# Patient Record
Sex: FEMALE | Race: WHITE | NOT HISPANIC OR LATINO | Employment: UNEMPLOYED | URBAN - METROPOLITAN AREA
[De-identification: names, ages, dates, MRNs, and addresses within clinical notes are randomized per-mention and may not be internally consistent; named-entity substitution may affect disease eponyms.]

---

## 2022-05-19 ENCOUNTER — TRANSCRIBE ORDERS (OUTPATIENT)
Dept: PHYSICAL THERAPY | Facility: HOSPITAL | Age: 72
End: 2022-05-19

## 2022-05-19 ENCOUNTER — HOSPITAL ENCOUNTER (OUTPATIENT)
Dept: PHYSICAL THERAPY | Facility: HOSPITAL | Age: 72
Setting detail: THERAPIES SERIES
Discharge: HOME | End: 2022-05-19

## 2022-05-19 DIAGNOSIS — M25.562 CHRONIC PAIN OF LEFT KNEE: Primary | ICD-10-CM

## 2022-05-19 DIAGNOSIS — G89.29 CHRONIC PAIN OF LEFT KNEE: Primary | ICD-10-CM

## 2022-05-19 DIAGNOSIS — M11.20 CHONDROCALCINOSIS: ICD-10-CM

## 2022-05-19 DIAGNOSIS — M17.12 OSTEOARTHRITIS OF LEFT KNEE, UNSPECIFIED OSTEOARTHRITIS TYPE: ICD-10-CM

## 2022-05-19 DIAGNOSIS — M25.562 ACUTE PAIN OF LEFT KNEE: Primary | ICD-10-CM

## 2022-05-19 PROCEDURE — 97161 PT EVAL LOW COMPLEX 20 MIN: CPT | Performed by: GENERAL ACUTE CARE HOSPITAL

## 2022-05-19 NOTE — THERAPY EVALUATION
Outpatient Physical Therapy Ortho Initial Evaluation  [facility]     Patient Name: Haydee Riggs  : 1950  MRN: [id number]  Today's Date: 2022      Visit Date: 2022    There is no problem list on file for this patient.       No past medical history on file.     No past surgical history on file.    Visit Dx:     ICD-10-CM ICD-9-CM   1. Chronic pain of left knee  M25.562 719.46    G89.29 338.29          Patient History     Row Name 22 1000             History    Chief Complaint Balance Problems;Difficulty Walking;Difficulty with daily activities;Joint stiffness;Muscle weakness;Pain  -HP      Type of Pain Knee pain  -HP      Date Current Problem(s) Began --    -      Brief Description of Current Complaint Patient states that in 2022 she woke up with increased significant left knee pain.  He states that her pain has been severe until about 3 weeks ago when she had an injection.  She states that the injection has helped significantly and she does not have as intense of pain in the left knee.  She currently ambulates with the use of a quad cane in the right hand.  -HP      Previous treatment for THIS PROBLEM Injections  -HP      Patient/Caregiver Goals Relieve pain;Return to prior level of function;Improve mobility;Improve strength;Know what to do to help the symptoms  -HP      Hand Dominance right-handed  -      Occupation/sports/leisure activities Gardening  -HP              Pain     Pain Location Knee  -      Pain at Present 0  -HP      Pain at Best 0  -HP      Pain at Worst 5  -HP      Pain Frequency Intermittent  -HP      Pain Description Aching;Discomfort;Sore;Tightness  -      What Performance Factors Make the Current Problem(s) WORSE? Use of the L LE  -      What Performance Factors Make the Current Problem(s) BETTER? Rest  -HP      Pain Comments Pain is along the medial aspect of the L knee the greatest recently  -      Tolerance Time- Standing WNL   -HP      Tolerance Time- Sitting WNL  -HP      Tolerance Time- Walking Increases pain  -HP      Tolerance Time- Lying WNL  -HP      Is your sleep disturbed? No  -HP              Fall Risk Assessment    Any falls in the past year: No  -HP              Daily Activities    Primary Language English  -HP      Pt Participated in POC and Goals Yes  -HP            User Key  (r) = Recorded By, (t) = Taken By, (c) = Cosigned By    Initials Name Provider Type     Connor Arnold PT Physical Therapist                 PT Ortho     Row Name 05/19/22 1000       Precautions and Contraindications    Precautions/Limitations no known precautions/limitations  -HP       Posture/Observations    Posture/Observations Comments Mild TTP along the medial aspect of the left knee joint.  Patient has mild swelling of the left lower extremity.  Patient ambulates with use of cane in right hand.  -HP       Quarter Clearing    Quarter Clearing Lower Quarter Clearing  -HP       Sensory Screen for Light Touch- Lower Quarter Clearing    L1 (inguinal area) Intact  -HP    L2 (anterior mid thigh) Intact  -HP    L3 (distal anterior thigh) Intact  -HP    L4 (medial lower leg/foot) Intact  -HP    L5 (lateral lower leg/great toe) Intact  -HP    S1 (bottom of foot) Intact  -HP       Myotomal Screen- Lower Quarter Clearing    Hip flexion (L2) WNL  -HP    Knee extension (L3) WNL  -HP    Ankle DF (L4) WNL  -HP    Great toe extension (L5) WNL  -HP    Ankle PF (S1) WNL  -HP    Knee flexion (S2) WNL  -HP          User Key  (r) = Recorded By, (t) = Taken By, (c) = Cosigned By    Initials Name Provider Type     Connor Arnold PT Physical Therapist                            Therapy Education  Education Details: HEP included: Standing heel raises, seated long arc quad with band, three-way hip with band  Given: HEP, Symptoms/condition management, Pain management  Program: New  How Provided: Verbal, Demonstration, Written  Provided to: Patient  Level of  Understanding: Teach back education performed, Verbalized, Demonstrated      PT OP Goals     Row Name 05/19/22 1000          PT Short Term Goals    STG Date to Achieve 06/09/22  -HP     STG 1 Patient to report improvement of symptoms by 50% or greater.  -HP     STG 1 Progress New  -HP     STG 2 Patient to report adherence to HEP.  -HP     STG 2 Progress New  -HP     STG 3 Patient to report improvement of LEFS score to greater than or equal to 45.  -HP     STG 3 Progress New  -HP            Long Term Goals    LTG Date to Achieve 06/30/22  -HP     LTG 1 Patient to report improvement of symptoms by 75% or greater.  -HP     LTG 1 Progress New  -HP     LTG 2 Patient to report independence with HEP.  -HP     LTG 2 Progress New  -HP     LTG 3 Patient to report improvement of LEFS score to greater than or equal to 55.  -HP     LTG 3 Progress New  -HP            Time Calculation    PT Goal Re-Cert Due Date 08/17/22  -HP           User Key  (r) = Recorded By, (t) = Taken By, (c) = Cosigned By    Initials Name Provider Type    Connor Gill, PT Physical Therapist                 PT Assessment/Plan     Row Name 05/19/22 1000          PT Assessment    Functional Limitations Impaired gait;Impaired locomotion;Limitations in functional capacity and performance;Performance in leisure activities  -     Impairments Endurance;Gait;Joint mobility;Locomotion;Muscle strength;Pain  -     Assessment Comments Patient is a 71-year-old female who presents with a low complex and evolving case of left knee pain.  Patient states that in January 2022 she started noticing increased left knee pain after she woke up.  She is unable to attribute this to any specific injury/trauma.  Patient recently had an injection to the left knee which states that she has had significant improvement since.  Upon evaluation, patient signs and symptoms are consistent with potential arthritic in nature pain of the left knee which warrants further skilled  therapy with a focus on improving strength, active range of motion, and functional mobility.  -HP     Please refer to paper survey for additional self-reported information Yes  -HP     Rehab Potential Good  -HP     Patient/caregiver participated in establishment of treatment plan and goals Yes  -HP     Patient would benefit from skilled therapy intervention Yes  -HP            PT Plan    PT Frequency 1x/week;2x/week  -HP     Predicted Duration of Therapy Intervention (PT) 8-10 visits  -HP     Planned CPT's? PT EVAL LOW COMPLEXITY: 03376;PT THER PROC EA 15 MIN: 53417;PT THER ACT EA 15 MIN: 81895;PT MANUAL THERAPY EA 15 MIN: 72967;PT GAIT TRAINING EA 15 MIN: 88678  -     Physical Therapy Interventions (Optional Details) balance training;fine motor skills;gait training;gross motor skills;home exercise program;joint mobilization;manual therapy techniques;modalities;patient/family education;ROM (Range of Motion);stair training;strengthening;stretching  -     PT Plan Comments Patient to benefit from further skilled therapy with a focus on decreasing pain, improving strength, and improving functional ability.  -           User Key  (r) = Recorded By, (t) = Taken By, (c) = Cosigned By    Initials Name Provider Type     Connor Arnold, PT Physical Therapist                                    Outcome Measure Options: Lower Extremity Functional Scale (LEFS)  Lower Extremity Functional Index  Any of your usual work, housework or school activities: Moderate difficulty  Your usual hobbies, recreational or sporting activities: Moderate difficulty  Getting into or out of the bath: Moderate difficulty  Walking between rooms: A little bit of difficulty  Putting on your shoes or socks: A little bit of difficulty  Squatting: A little bit of difficulty  Lifting an object, like a bag of groceries from the floor: A little bit of difficulty  Performing light activities around your home: A little bit of difficulty  Performing heavy  activities around your home: Quite a bit of difficulty  Getting into or out of a car: Moderate difficulty  Walking 2 blocks: Moderate difficulty  Walking a mile: Moderate difficulty  Going up or down 10 stairs (about 1 flight of stairs): Quite a bit of difficulty  Standing for 1 hour: Quite a bit of difficulty  Sitting for 1 hour: Moderate difficulty  Running on even ground: Extreme difficulty or unable to perform activity  Running on uneven ground: Extreme difficulty or unable to perform activity  Making sharp turns while running fast: Extreme difficulty or unable to perform activity  Hopping: Extreme difficulty or unable to perform activity  Rolling over in bed: Moderate difficulty  Total: 34      Time Calculation:     Start Time: 1000  Untimed Charges  PT Eval/Re-eval Minutes: 60  Total Minutes  Untimed Charges Total Minutes: 60   Total Minutes: 60     Therapy Charges for Today     Code Description Service Date Service Provider Modifiers Qty    60936967260 HC PT EVAL LOW COMPLEXITY 4 5/19/2022 Connor Arnold, PT GP 1          PT G-Tara  Outcome Measure Options: Lower Extremity Functional Scale (LEFS)  Total: 34         Connor Arnold PT  5/19/2022

## 2022-05-27 ENCOUNTER — HOSPITAL ENCOUNTER (OUTPATIENT)
Dept: PHYSICAL THERAPY | Facility: HOSPITAL | Age: 72
Setting detail: THERAPIES SERIES
Discharge: HOME | End: 2022-05-27

## 2022-05-27 DIAGNOSIS — M25.562 CHRONIC PAIN OF LEFT KNEE: Primary | ICD-10-CM

## 2022-05-27 DIAGNOSIS — G89.29 CHRONIC PAIN OF LEFT KNEE: Primary | ICD-10-CM

## 2022-05-27 PROCEDURE — 97110 THERAPEUTIC EXERCISES: CPT | Performed by: GENERAL ACUTE CARE HOSPITAL

## 2022-05-27 NOTE — THERAPY TREATMENT NOTE
Outpatient Physical Therapy Ortho Treatment Note  [facility]     Patient Name: Haydee Riggs  : 1950  MRN: [id number]  Today's Date: 2022      Visit Date: 2022    Visit Dx:    ICD-10-CM ICD-9-CM   1. Chronic pain of left knee  M25.562 719.46    G89.29 338.29       There is no problem list on file for this patient.       No past medical history on file.     No past surgical history on file.                     PT Assessment/Plan     Row Name 22          PT Assessment    Assessment Comments Pt did well with her session today, but was limited the greatest on her SOA/fatgiue. She shows improvement in her tolerance to movement with decrease pain in the L knee. Pt did have noticeable pitting edema of the bilateral lower extremities following today's exercises.  Patient was educated on lower extremity elevation in order to help decrease swelling.  If patient does not have noticeable improvement in her swelling by next visit, recommend patient to be seen by MD for further evaluation.  -HP            PT Plan    PT Plan Comments Continue per POC and assess edema at next visit  -HP           User Key  (r) = Recorded By, (t) = Taken By, (c) = Cosigned By    Initials Name Provider Type    Connor Gill, PT Physical Therapist                   OP Exercises     Row Name 22             Subjective Comments    Subjective Comments Pt states that she is doing well and went on a long walk yesterday  -HP              Subjective Pain    Able to rate subjective pain? yes  -HP      Pre-Treatment Pain Level 2  -HP      Post-Treatment Pain Level 1  -HP              Total Minutes    38518 - PT Therapeutic Exercise Minutes 40  -HP              Exercise 1    Exercise Name 1 Nustep  -HP      Time 1 5 min  -HP      Additional Comments L5  -HP              Exercise 2    Exercise Name 2 Bridges  -HP      Sets 2 2  -HP      Reps 2 10  -HP              Exercise 3    Exercise Name 3 SLR  -HP       Sets 3 2  -HP      Reps 3 10  -HP              Exercise 4    Exercise Name 4 Hip abd  -HP      Sets 4 2  -HP      Reps 4 10  -HP              Exercise 5    Exercise Name 5 Hip ext  -HP      Sets 5 2  -HP      Reps 5 10  -HP              Exercise 6    Exercise Name 6 Prone knee curls  -HP      Sets 6 2  -HP      Reps 6 10  -HP              Exercise 7    Exercise Name 7 3# ankle weight:  LAQ, seated marches, heel and toe raises, standing 3 way hip, standing marches, standing knee curls  -HP      Sets 7 2  -HP      Reps 7 10 each  -HP              Exercise 8    Exercise Name 8 Side and bwd stepping at table with 3# ankle weight  -HP      Reps 8 4 laps each  -HP            User Key  (r) = Recorded By, (t) = Taken By, (c) = Cosigned By    Initials Name Provider Type    HP Connor Arnold, PT Physical Therapist                                                Time Calculation:   Start Time: 0730  Timed Charges  52107 - PT Therapeutic Exercise Minutes: 40  Total Minutes  Timed Charges Total Minutes: 40   Total Minutes: 40  Therapy Charges for Today     Code Description Service Date Service Provider Modifiers Qty    52373493587 HC PT THER PROC EA 15 MIN 5/27/2022 Connor Arnold, PT GP 3                    Connor Arnold, PT  5/27/2022

## 2022-05-31 ENCOUNTER — HOSPITAL ENCOUNTER (OUTPATIENT)
Dept: PHYSICAL THERAPY | Facility: HOSPITAL | Age: 72
Setting detail: THERAPIES SERIES
Discharge: HOME | End: 2022-05-31

## 2022-05-31 DIAGNOSIS — M25.562 CHRONIC PAIN OF LEFT KNEE: Primary | ICD-10-CM

## 2022-05-31 DIAGNOSIS — G89.29 CHRONIC PAIN OF LEFT KNEE: Primary | ICD-10-CM

## 2022-05-31 PROCEDURE — 97110 THERAPEUTIC EXERCISES: CPT

## 2022-06-07 ENCOUNTER — HOSPITAL ENCOUNTER (OUTPATIENT)
Dept: PHYSICAL THERAPY | Facility: HOSPITAL | Age: 72
Setting detail: THERAPIES SERIES
Discharge: HOME | End: 2022-06-07

## 2022-06-07 DIAGNOSIS — M25.562 CHRONIC PAIN OF LEFT KNEE: Primary | ICD-10-CM

## 2022-06-07 DIAGNOSIS — G89.29 CHRONIC PAIN OF LEFT KNEE: Primary | ICD-10-CM

## 2022-06-07 PROCEDURE — 97110 THERAPEUTIC EXERCISES: CPT

## 2022-06-07 NOTE — THERAPY TREATMENT NOTE
Outpatient Physical Therapy Ortho Treatment Note  [facility]     Patient Name: Haydee Riggs  : 1950  MRN: [id number]  Today's Date: 2022      Visit Date: 2022    Visit Dx:    ICD-10-CM ICD-9-CM   1. Chronic pain of left knee  M25.562 719.46    G89.29 338.29       There is no problem list on file for this patient.       No past medical history on file.     No past surgical history on file.                     PT Assessment/Plan     Row Name 22 0945          PT Assessment    Assessment Comments Patient stating she feels that therapy is helping improve her knee pain. She performs well with exercises requiring verbal cueing for pacing as well as form correction. She does require rest breaks between exercises due to shortness of air she experiences but no prolonged breaks needed. Patient reporting her knee pain decreased durign session with performance of exercies. (P)   -DM            PT Plan    PT Plan Comments Continue with current POC addressing strength and functional mobility deficits. Attempt more standing exercise to promote functional gains. (P)   -DM           User Key  (r) = Recorded By, (t) = Taken By, (c) = Cosigned By    Initials Name Provider Type    Mandi Weiner, PT Student PT Student                   OP Exercises     Row Name 22 9122             Subjective Comments    Subjective Comments Edan reports that following her last session she had some mild pain in both knees and some increased soreness. (P)   -DM              Subjective Pain    Able to rate subjective pain? yes (P)   -DM      Pre-Treatment Pain Level 2 (P)   -DM      Post-Treatment Pain Level 1 (P)   -DM              Total Minutes    39478 - PT Therapeutic Exercise Minutes 41 (P)   -DM              Exercise 1    Exercise Name 1 Nustep (P)   -DM      Time 1 5 min (P)   -DM      Additional Comments L5 (P)   -DM              Exercise 2    Exercise Name 2 Bridges (P)   -DM      Sets 2 2 (P)   -DM       Reps 2 10 (P)   -DM              Exercise 3    Exercise Name 3 SLR (P)   -DM      Sets 3 2 (P)   -DM      Reps 3 10 (P)   -DM              Exercise 4    Exercise Name 4 3 way standing hip (P)   -DM      Sets 4 2 (P)   -DM      Reps 4 10 (P)   -DM              Exercise 5    Exercise Name 5 Seated hamstring curl with yellow band (P)   -DM      Sets 5 2 (P)   -DM      Reps 5 10 (P)   -DM              Exercise 6    Exercise Name 6 sit to stand (P)   -DM      Sets 6 2 (P)   -DM      Reps 6 10 (P)   -DM      Additional Comments holding ball in hand (P)   -DM              Exercise 7    Exercise Name 7 LAQ 3# (P)   -DM      Sets 7 2 (P)   -DM      Reps 7 10 each (P)   -DM            User Key  (r) = Recorded By, (t) = Taken By, (c) = Cosigned By    Initials Name Provider Type    DM Alissa Nixon, PT Student PT Student                                                Time Calculation:   Start Time: (P) 0945  Timed Charges  85694 - PT Therapeutic Exercise Minutes: (P) 41  Total Minutes  Timed Charges Total Minutes: (P) 41   Total Minutes: (P) 41  Therapy Charges for Today     Code Description Service Date Service Provider Modifiers Qty    74369640685 HC PT THER PROC EA 15 MIN 6/7/2022 Alissa Nixon, PT Student GP 3                    ALISSA NIXON, PT Student  6/7/2022

## 2022-06-14 ENCOUNTER — HOSPITAL ENCOUNTER (OUTPATIENT)
Dept: PHYSICAL THERAPY | Facility: HOSPITAL | Age: 72
Setting detail: THERAPIES SERIES
Discharge: HOME | End: 2022-06-14

## 2022-06-14 DIAGNOSIS — M25.562 CHRONIC PAIN OF LEFT KNEE: Primary | ICD-10-CM

## 2022-06-14 DIAGNOSIS — G89.29 CHRONIC PAIN OF LEFT KNEE: Primary | ICD-10-CM

## 2022-06-14 PROCEDURE — 97110 THERAPEUTIC EXERCISES: CPT

## 2022-06-14 NOTE — THERAPY TREATMENT NOTE
Outpatient Physical Therapy Ortho Treatment Note  [facility]     Patient Name: Haydee Riggs  : 1950  MRN: [id number]  Today's Date: 2022      Visit Date: 2022    Visit Dx:    ICD-10-CM ICD-9-CM   1. Chronic pain of left knee  M25.562 719.46    G89.29 338.29       There is no problem list on file for this patient.       No past medical history on file.     No past surgical history on file.                     PT Assessment/Plan     Row Name 22 0845          PT Assessment    Assessment Comments Patient overall reports that her knee pain is improving. She occasionally has tightness in the backside of her left knee so she was educated on hamstring stretches she can perform at home to help remedy this. She requires less frequent rest breaks and her work of breathing is improved from last session as well. She does require demonstration and verbal cues for proper form during exercises. (P)   -DM            PT Plan    PT Plan Comments Continue with strengthening of the left lower extremity to help support the knee and return to PLOF. (P)   -DM           User Key  (r) = Recorded By, (t) = Taken By, (c) = Cosigned By    Initials Name Provider Type    Mandi Weiner, PT Student PT Student                   OP Exercises     Row Name 22 7835             Subjective Comments    Subjective Comments Patient stating that she is having some mild discomfort on medial side of knee but otherwise feels good. Had some tightness following last session. (P)   -DM              Subjective Pain    Able to rate subjective pain? yes (P)   -DM      Pre-Treatment Pain Level 1 (P)   -DM      Post-Treatment Pain Level 0 (P)   -DM              Total Minutes    48385 - PT Therapeutic Exercise Minutes 42 (P)   -DM              Exercise 1    Exercise Name 1 Nustep (P)   -DM      Time 1 5 min (P)   -DM      Additional Comments L5 (P)   -DM              Exercise 2    Exercise Name 2 Bridges (P)   -DM      Sets  2 2 (P)   -DM      Reps 2 10 (P)   -DM              Exercise 3    Exercise Name 3 SLR (P)   -DM      Sets 3 2 (P)   -DM      Reps 3 10 (P)   -DM              Exercise 4    Exercise Name 4 3 way standing hip (P)   -DM      Sets 4 2 (P)   -DM      Reps 4 10 (P)   -DM              Exercise 5    Exercise Name 5 standing hamstring curl 3# (P)   -DM      Sets 5 2 (P)   -DM      Reps 5 10 (P)   -DM              Exercise 6    Exercise Name 6 sit to stand (P)   -DM      Sets 6 2 (P)   -DM      Reps 6 10 (P)   -DM      Additional Comments 4# ball (P)   -DM              Exercise 7    Exercise Name 7 LAQ 3# (P)   -DM      Sets 7 2 (P)   -DM      Reps 7 10 each (P)   -DM              Exercise 8    Exercise Name 8 supine hamstring stretch/flossing (P)   -DM      Sets 8 1 (P)   -DM      Reps 8 10 (P)   -DM              Exercise 9    Exercise Name 9 step ups 6 inch (P)   -DM      Sets 9 2 (P)   -DM      Reps 9 5 (P)   -DM              Exercise 10    Exercise Name 10 seated hamstring stretch (P)   -DM      Reps 10 2 (P)   -DM      Time 10 30 sec (P)   -DM            User Key  (r) = Recorded By, (t) = Taken By, (c) = Cosigned By    Initials Name Provider Type    DM Alissa Nixon, PT Student PT Student                                                Time Calculation:   Start Time: (P) 0845  Timed Charges  03158 - PT Therapeutic Exercise Minutes: (P) 42  Total Minutes  Timed Charges Total Minutes: (P) 42   Total Minutes: (P) 42  Therapy Charges for Today     Code Description Service Date Service Provider Modifiers Qty    55803708549  PT THER PROC EA 15 MIN 6/14/2022 Alissa Nixon, PT Student GP 3                    ALISSA NIXON, PT Student  6/14/2022

## 2022-06-21 ENCOUNTER — HOSPITAL ENCOUNTER (OUTPATIENT)
Dept: PHYSICAL THERAPY | Facility: HOSPITAL | Age: 72
Setting detail: THERAPIES SERIES
Discharge: HOME | End: 2022-06-21

## 2022-06-21 DIAGNOSIS — M25.562 CHRONIC PAIN OF LEFT KNEE: Primary | ICD-10-CM

## 2022-06-21 DIAGNOSIS — G89.29 CHRONIC PAIN OF LEFT KNEE: Primary | ICD-10-CM

## 2022-06-21 PROCEDURE — 97110 THERAPEUTIC EXERCISES: CPT

## 2022-06-21 PROCEDURE — 97110 THERAPEUTIC EXERCISES: CPT | Performed by: GENERAL ACUTE CARE HOSPITAL

## 2022-06-21 NOTE — THERAPY PROGRESS REPORT/RE-CERT
Outpatient Physical Therapy Ortho Progress Note  [facility]     Patient Name: Haydee Riggs  : 1950  MRN: [id number]  Today's Date: 2022      Visit Date: 2022    Visit Dx:    ICD-10-CM ICD-9-CM   1. Chronic pain of left knee  M25.562 719.46    G89.29 338.29       There is no problem list on file for this patient.       No past medical history on file.     No past surgical history on file.                     PT Assessment/Plan     Row Name 22 0900          PT Assessment    Functional Limitations Impaired gait;Impaired locomotion;Limitations in functional capacity and performance;Performance in leisure activities  -HP     Impairments Endurance;Gait;Joint mobility;Locomotion;Muscle strength;Pain  -HP     Assessment Comments Patient continues to demonstrate improvement in pain level and functional ability. Patient improved score on LEFS from 34/80 to 41/80 and states that she overall feels she is doing much better overall since begining therapy. With new golfer pickup exercise, patient demonstrates difficulty with coordination of opposite hand and foot and was unable to mirror therapist for proper form of exercise. Tactile and verbal cues used to attempt to facilitate proper movement pattern with poor result. Patient feels that she will be ready for discharge at next session and that she can manage an independent HEP at home to continue stregnthening for her knee and LEs.  -HP (r) DM (t) HP (c)     Please refer to paper survey for additional self-reported information Yes  -HP     Rehab Potential Good  -HP     Patient/caregiver participated in establishment of treatment plan and goals Yes  -HP     Patient would benefit from skilled therapy intervention Yes  -HP            PT Plan    PT Frequency 1x/week  -HP     Predicted Duration of Therapy Intervention (PT) 1-2 visits  -HP     PT Plan Comments Plan for discharge to independent HEP at next session due to progress made.  -HP (r) DM (t)  HP (c)           User Key  (r) = Recorded By, (t) = Taken By, (c) = Cosigned By    Initials Name Provider Type    HP Connor Arnold, PT Physical Therapist    Mandi Weiner, PT Student PT Student                   OP Exercises     Row Name 06/21/22 0900             Subjective Comments    Subjective Comments Patient reporting she has a dull, low level ache in the knee but not necessarily painful. Happy with how she is doing.  -HP (r) DM (t) HP (c)              Subjective Pain    Able to rate subjective pain? yes  -HP (r) DM (t) HP (c)      Pre-Treatment Pain Level 0  -HP (r) DM (t) HP (c)      Post-Treatment Pain Level 0  -HP (r) DM (t) HP (c)              Total Minutes    63442 - PT Therapeutic Exercise Minutes 40  -HP (r) DM (t) HP (c)              Exercise 1    Exercise Name 1 Nustep  -HP (r) DM (t) HP (c)      Time 1 5 min  -HP (r) DM (t) HP (c)      Additional Comments L5  -HP (r) DM (t) HP (c)              Exercise 2    Exercise Name 2 Bridges  -HP (r) DM (t) HP (c)      Sets 2 2  -HP (r) DM (t) HP (c)      Reps 2 10  -HP (r) DM (t) HP (c)              Exercise 4    Exercise Name 4 3 way standing hip  -HP (r) DM (t) HP (c)      Sets 4 2  -HP (r) DM (t) HP (c)      Reps 4 10  -HP (r) DM (t) HP (c)              Exercise 5    Exercise Name 5 standing hamstring curl 3#  -HP (r) DM (t) HP (c)      Sets 5 2  -HP (r) DM (t) HP (c)      Reps 5 10  -HP (r) DM (t) HP (c)              Exercise 6    Exercise Name 6 sit to stand  -HP (r) DM (t) HP (c)      Sets 6 2  -HP (r) DM (t) HP (c)      Reps 6 10  -HP (r) DM (t) HP (c)      Additional Comments 4# ball  -HP (r) DM (t) HP (c)              Exercise 7    Exercise Name 7 LAQ 3#  -HP (r) DM (t) HP (c)      Sets 7 2  -HP (r) DM (t) HP (c)      Reps 7 10 each  -HP (r) DM (t) HP (c)              Exercise 9    Exercise Name 9 step ups 6 inch  -HP (r) DM (t) HP (c)      Sets 9 2  -HP (r) DM (t) HP (c)      Reps 9 5  -HP (r) DM (t) HP (c)              Exercise 11     Exercise Name 11 Golf pickup 5# KB  -HP (r) DM (t) HP (c)      Reps 11 10 each  -HP (r) DM (t) HP (c)            User Key  (r) = Recorded By, (t) = Taken By, (c) = Cosigned By    Initials Name Provider Type    Connor Gill, PT Physical Therapist    Mandi Weiner, PT Student PT Student                              PT OP Goals     Row Name 06/21/22 0900          PT Short Term Goals    STG Date to Achieve 06/09/22  -HP (r) DM (t) HP (c)     STG 1 Patient to report improvement of symptoms by 50% or greater.  -HP (r) DM (t) HP (c)     STG 1 Progress Met  -HP (r) DM (t) HP (c)     STG 2 Patient to report adherence to HEP.  -HP (r) DM (t) HP (c)     STG 2 Progress Met  -HP (r) DM (t) HP (c)     STG 3 Patient to report improvement of LEFS score to greater than or equal to 45.  -HP (r) DM (t) HP (c)     STG 3 Progress Ongoing  -HP (r) DM (t) HP (c)            Long Term Goals    LTG Date to Achieve 06/30/22  -HP (r) DM (t) HP (c)     LTG 1 Patient to report improvement of symptoms by 75% or greater.  -HP (r) DM (t) HP (c)     LTG 1 Progress Met  -HP (r) DM (t) HP (c)     LTG 2 Patient to report independence with HEP.  -HP (r) DM (t) HP (c)     LTG 2 Progress Ongoing  -HP (r) DM (t) HP (c)     LTG 3 Patient to report improvement of LEFS score to greater than or equal to 55.  -HP (r) DM (t) HP (c)     LTG 3 Progress Ongoing  -HP (r) DM (t) HP (c)            Time Calculation    PT Goal Re-Cert Due Date 08/17/22  -HP (r) DM (t) HP (c)           User Key  (r) = Recorded By, (t) = Taken By, (c) = Cosigned By    Initials Name Provider Type    Connor Gill, PT Physical Therapist    Mandi Weiner, PT Student PT Student                     Outcome Measure Options: Lower Extremity Functional Scale (LEFS)  Lower Extremity Functional Index  Any of your usual work, housework or school activities: Moderate difficulty  Your usual hobbies, recreational or sporting activities: Moderate difficulty  Getting into or out  of the bath: A little bit of difficulty  Walking between rooms: No difficulty  Putting on your shoes or socks: A little bit of difficulty  Squatting: Moderate difficulty  Lifting an object, like a bag of groceries from the floor: Moderate difficulty  Performing light activities around your home: A little bit of difficulty  Performing heavy activities around your home: Quite a bit of difficulty  Getting into or out of a car: A little bit of difficulty  Walking 2 blocks: Quite a bit of difficulty  Walking a mile: Quite a bit of difficulty  Going up or down 10 stairs (about 1 flight of stairs): Moderate difficulty  Standing for 1 hour: A little bit of difficulty  Sitting for 1 hour: A little bit of difficulty  Running on even ground: Extreme difficulty or unable to perform activity  Running on uneven ground: Extreme difficulty or unable to perform activity  Making sharp turns while running fast: Extreme difficulty or unable to perform activity  Hopping: A little bit of difficulty  Rolling over in bed: A little bit of difficulty  Total: 41      Time Calculation:   Start Time: 0900  Timed Charges  10624 - PT Therapeutic Exercise Minutes: 40  Total Minutes  Timed Charges Total Minutes: 40   Total Minutes: 40  Therapy Charges for Today     Code Description Service Date Service Provider Modifiers Qty    [id number]  PT THER PROC EA 15 MIN 6/21/2022 Connor Arnold, PT GP 3          PT G-Codes  Outcome Measure Options: Lower Extremity Functional Scale (LEFS)  Total: 41         Connor Arnold, PT  6/21/2022

## 2022-06-23 ENCOUNTER — HOSPITAL ENCOUNTER (OUTPATIENT)
Dept: PHYSICAL THERAPY | Facility: HOSPITAL | Age: 72
Setting detail: THERAPIES SERIES
Discharge: HOME | End: 2022-06-23

## 2022-06-23 DIAGNOSIS — G89.29 CHRONIC PAIN OF LEFT KNEE: Primary | ICD-10-CM

## 2022-06-23 DIAGNOSIS — M25.562 CHRONIC PAIN OF LEFT KNEE: Primary | ICD-10-CM

## 2022-06-23 PROCEDURE — 97110 THERAPEUTIC EXERCISES: CPT

## 2022-06-23 NOTE — THERAPY DISCHARGE NOTE
Outpatient Physical Therapy Ortho Treatment Note/Discharge Summary  [facility]     Patient Name: Haydee Riggs  : 1950  MRN: [id number]  Today's Date: 2022      Visit Date: 2022    Visit Dx:    ICD-10-CM ICD-9-CM   1. Chronic pain of left knee  M25.562 719.46    G89.29 338.29       There is no problem list on file for this patient.       No past medical history on file.     No past surgical history on file.                     PT Assessment/Plan     Row Name 22 0900          PT Assessment    Functional Limitations Limitation in home management (P)   -DM     Impairments Muscle strength;Pain (P)   -DM     Assessment Comments Patient is being discharged on this date to an independent HEP. 2/3 short term and 2/3 long term goals were achieved; her LEFS score has improved throughut her course of care but did not fall low enough to reach the threshold that was set in the goal. Patient self reports that she is not bothered by her pain as frequently and she feels as if she can participate in home activities with greater ease. She still reports occasional low level pain in bilateral knees. Her main complaint at this time is her increased work of breathing, and therapist encouraged her to visit her primary care physician to address this concern. (P)   -DM     Please refer to paper survey for additional self-reported information Yes (P)   -DM     Rehab Potential Good (P)   -DM     Patient/caregiver participated in establishment of treatment plan and goals Yes (P)   -DM     Patient would benefit from skilled therapy intervention No (P)   -DM            PT Plan    PT Plan Comments Patient to be discharged to an independent HEP at this time due to progress made with goals and functional gains and decreased pain levels. (P)   -DM           User Key  (r) = Recorded By, (t) = Taken By, (c) = Cosigned By    Initials Name Provider Type    DM Mandi Nixon, PT Student PT Student                      OP Exercises     Row Name 06/23/22 0900             Subjective Comments    Subjective Comments Patient stating she is feelling good today and is still agreeable for discharge. (P)   -DM              Subjective Pain    Able to rate subjective pain? yes (P)   -DM      Pre-Treatment Pain Level 0 (P)   -DM              Total Minutes    37516 - PT Therapeutic Exercise Minutes 38 (P)   -DM              Exercise 1    Exercise Name 1 Nustep (P)   -DM      Time 1 5 min (P)   -DM              Exercise 2    Exercise Name 2 Bridges (P)   -DM      Sets 2 2 (P)   -DM      Reps 2 10 (P)   -DM              Exercise 4    Exercise Name 4 3 way standing hip (P)   -DM      Sets 4 2 (P)   -DM      Reps 4 10 (P)   -DM              Exercise 6    Exercise Name 6 sit to stand (P)   -DM      Reps 6 10 (P)   -DM              Exercise 7    Exercise Name 7 LAQ with band (P)   -DM      Sets 7 2 (P)   -DM      Reps 7 10 (P)   -DM              Exercise 8    Exercise Name 8 standing marches (P)   -DM      Reps 8 20 (P)   -DM              Exercise 9    Exercise Name 9 step ups 6 inch (P)   -DM      Sets 9 10 (P)   -DM            User Key  (r) = Recorded By, (t) = Taken By, (c) = Cosigned By    Initials Name Provider Type    Mandi Weiner, PT Student PT Student                                PT OP Goals     Row Name 06/23/22 0900          PT Short Term Goals    STG Date to Achieve 06/09/22 (P)   -DM     STG 1 Patient to report improvement of symptoms by 50% or greater. (P)   -DM     STG 1 Progress Met (P)   -DM     STG 2 Patient to report adherence to HEP. (P)   -DM     STG 2 Progress Met (P)   -DM     STG 3 Patient to report improvement of LEFS score to greater than or equal to 45. (P)   -DM     STG 3 Progress Ongoing (P)   -DM            Long Term Goals    LTG Date to Achieve 06/30/22 (P)   -DM     LTG 1 Patient to report improvement of symptoms by 75% or greater. (P)   -DM     LTG 1 Progress Met (P)   -DM     LTG 2 Patient to report  independence with HEP. (P)   -DM     LTG 2 Progress Met (P)   -DM     LTG 3 Patient to report improvement of LEFS score to greater than or equal to 55. (P)   -DM     LTG 3 Progress Ongoing (P)   -DM            Time Calculation    PT Goal Re-Cert Due Date 08/17/22 (P)   -DM           User Key  (r) = Recorded By, (t) = Taken By, (c) = Cosigned By    Initials Name Provider Type    DM Mandi Nixon, PT Student PT Student                Therapy Education  Education Details: (P) HEP updated to include: step up, sit to stand, bridge, 3 way standing hip, standing marches, LAQ with band. Edna also educated on pursed lip breathing to improve her work of breathing with exercises. She was also encouraged to reach out to her doctor since this issue is bothering her.  Given: (P) HEP  Program: (P) Progressed  How Provided: (P) Verbal, Demonstration, Written  Provided to: (P) Patient  Level of Understanding: (P) Teach back education performed, Verbalized, Demonstrated    Outcome Measure Options: (P) Lower Extremity Functional Scale (LEFS)  Lower Extremity Functional Index  Any of your usual work, housework or school activities: (P) Moderate difficulty  Your usual hobbies, recreational or sporting activities: (P) Moderate difficulty  Getting into or out of the bath: (P) A little bit of difficulty  Walking between rooms: (P) No difficulty  Putting on your shoes or socks: (P) A little bit of difficulty  Squatting: (P) Moderate difficulty  Lifting an object, like a bag of groceries from the floor: (P) Moderate difficulty  Performing light activities around your home: (P) A little bit of difficulty  Performing heavy activities around your home: (P) Quite a bit of difficulty  Getting into or out of a car: (P) A little bit of difficulty  Walking 2 blocks: (P) Quite a bit of difficulty  Walking a mile: (P) Quite a bit of difficulty  Going up or down 10 stairs (about 1 flight of stairs): (P) Moderate difficulty  Standing for 1 hour:  (P) A little bit of difficulty  Sitting for 1 hour: (P) A little bit of difficulty  Running on even ground: (P) Extreme difficulty or unable to perform activity  Running on uneven ground: (P) Extreme difficulty or unable to perform activity  Making sharp turns while running fast: (P) Extreme difficulty or unable to perform activity  Hopping: (P) A little bit of difficulty  Rolling over in bed: (P) A little bit of difficulty  Total: (P) 41      Time Calculation:   Start Time: (P) 0900  Timed Charges  41995 - PT Therapeutic Exercise Minutes: (P) 38  Total Minutes  Timed Charges Total Minutes: (P) 38   Total Minutes: (P) 38  Therapy Charges for Today     Code Description Service Date Service Provider Modifiers Qty    60868931632 HC PT THER PROC EA 15 MIN 6/23/2022 Alissa Nixon, PT Student GP 3          PT G-Tara  Outcome Measure Options: (P) Lower Extremity Functional Scale (LEFS)  Total: (P) 41     OP PT Discharge Summary  Date of Discharge: (P) 06/23/22  Reason for Discharge: (P) Maximum functional potential achieved  Outcomes Achieved: (P) Patient able to partially acheive established goals  Discharge Destination: (P) Home with home program  Discharge Instructions/Additional Comments: (P) Patient to be discharged to independent [facility] at this time due to functional improvements and decreased pain level.      ALISSA NIXON, PT Student  6/23/2022